# Patient Record
Sex: FEMALE | Race: WHITE | NOT HISPANIC OR LATINO | Employment: UNEMPLOYED | URBAN - METROPOLITAN AREA
[De-identification: names, ages, dates, MRNs, and addresses within clinical notes are randomized per-mention and may not be internally consistent; named-entity substitution may affect disease eponyms.]

---

## 2020-08-19 ENCOUNTER — OFFICE VISIT (OUTPATIENT)
Dept: GASTROENTEROLOGY | Facility: CLINIC | Age: 35
End: 2020-08-19
Payer: COMMERCIAL

## 2020-08-19 VITALS
HEIGHT: 65 IN | SYSTOLIC BLOOD PRESSURE: 122 MMHG | WEIGHT: 230 LBS | TEMPERATURE: 97.5 F | BODY MASS INDEX: 38.32 KG/M2 | DIASTOLIC BLOOD PRESSURE: 72 MMHG | HEART RATE: 81 BPM

## 2020-08-19 DIAGNOSIS — Z12.11 SCREENING FOR COLON CANCER: ICD-10-CM

## 2020-08-19 DIAGNOSIS — K57.32 DIVERTICULITIS OF LARGE INTESTINE WITHOUT PERFORATION OR ABSCESS WITHOUT BLEEDING: Primary | ICD-10-CM

## 2020-08-19 PROCEDURE — 99244 OFF/OP CNSLTJ NEW/EST MOD 40: CPT | Performed by: INTERNAL MEDICINE

## 2020-08-19 RX ORDER — FLUTICASONE PROPIONATE 50 MCG
1 SPRAY, SUSPENSION (ML) NASAL AS NEEDED
COMMUNITY

## 2020-08-19 RX ORDER — ETONOGESTREL 68 MG/1
68 IMPLANT SUBCUTANEOUS ONCE
COMMUNITY

## 2020-08-19 RX ORDER — METRONIDAZOLE 500 MG/1
500 TABLET ORAL EVERY 8 HOURS SCHEDULED
COMMUNITY
End: 2020-10-19

## 2020-08-19 RX ORDER — CEFUROXIME AXETIL 500 MG/1
500 TABLET ORAL EVERY 12 HOURS SCHEDULED
COMMUNITY
End: 2020-10-19

## 2020-08-19 NOTE — PATIENT INSTRUCTIONS
Encourage plenty of fluids  Low-fiber diet  Finish antibiotics given in the emergency room  Once feeling better okay to start reintroducing fiber in to diet  Schedule colonoscopy for about 2 months from now

## 2020-08-19 NOTE — PROGRESS NOTES
[facility] - Outpatient Consultation  Divina Christy 36 y o  female MRN: [id number]  Encounter: [id number]    ASSESSMENT AND PLAN:      1  Diverticulitis of large intestine without perforation or abscess without bleeding  Symptoms consistent with acute diverticulitis  Lower abdominal pain, fevers notice and altered bowel habits are all consistent and CT scan confirmed diverticulosis with focal inflammation in the sigmoid colon  Pain is improved although loose stools persist   Advise continuing the same treatment regimen and slowly advancing diet from liquids to low residue for now  If diarrhea symptoms persist and stool studies will be indicated  Ultimately should have colonoscopic evaluation to exclude other sigmoid pathology given the abnormal CT scan  · Complete prescribed antibiotics  · Encourage plenty of fluids, okay to eat low fiber diet  · As symptoms improve over the next week okay to start reintroducing fiber  · Schedule colonoscopy in about 2 months    2  Screening for colon cancer  Average risk in absence of family history  Assuming colonoscopy is negative for any neoplasia, screening colonoscopy at age 48 will be recommended  Followup Appointment:   2-3 weeks  ______________________________________________________________________    Chief Complaint   Patient presents with    Diverticulitis     [facility] follow up        HPI:   Divina Christy is a 36y o  year old female who presents with lower abdominal pain and diarrhea  Well until July  Had diarrhea and headache, tested for Covid, negative  Started to get better, but not resolved  Last week, sever abdominal pain, diffuse abdominal, bilateral   End of the week, got worse, went to urgent car, and referred to ER  CT showed sigmoid diverticulitis  Started on Ceftin/Flagyl  Pain resolved, not feeling well yet  Presently liquids and low fiber diet  No fever, though felt feverish initially    Stools still loose   No blood  Having 1-2 watery stools daily, still diarrhea, but less frequent  Normally formed stools daily  No travel, no antibiotics, No new supplements other than probiotic  No ill contacts  Municipal water  Historical Information   No past medical history on file  No past surgical history on file  Social History     Substance and Sexual Activity   Alcohol Use Yes     Social History     Substance and Sexual Activity   Drug Use Not on file     Social History     Tobacco Use   Smoking Status Never Smoker   Smokeless Tobacco Never Used     Family History   Problem Relation Age of Onset    Diabetes Mother     Heart disease Father     Diabetes Father        Meds/Allergies     Current Outpatient Medications:     cefuroxime (CEFTIN) 500 mg tablet    etonogestrel (Nexplanon) subdermal implant    fluticasone (FLONASE) 50 mcg/act nasal spray    metroNIDAZOLE (FLAGYL) 500 mg tablet    sertraline (ZOLOFT) 50 mg tablet    Allergies   Allergen Reactions    Sulfa Antibiotics        PHYSICAL EXAM:    Blood pressure 122/72, pulse 81, temperature 97 5 °F (36 4 °C), height 5' 5" (1 651 m), weight 104 kg (230 lb)  Body mass index is 38 27 kg/m²  General Appearance: NAD, cooperative, alert  Eyes: Anicteric, PERRLA, EOMI  ENT:  Normocephalic, atraumatic, normal mucosa  Neck:  Supple, symmetrical, trachea midline,   Resp:  Clear to auscultation bilaterally; no rales, rhonchi or wheezing; respirations unlabored   CV:  S1 S2, Regular rate and rhythm; no murmur, rub, or gallop  GI:  Soft, non-tender, non-distended; normal bowel sounds; no masses, no organomegaly   Rectal: Deferred  Musculoskeletal: No cyanosis, clubbing or edema  Normal ROM    Skin:  No jaundice, rashes, or lesions   Heme/Lymph: No palpable cervical lymphadenopathy  Psych: Normal affect, good eye contact  Neuro: No gross deficits, AAOx3    Lab Results:    CBC normal   electrolytes BUN and creatinine normal   LFTs normal except for ALT of 44 amylase and lipase normal  Radiology Results:     CT scan with sigmoid colon diverticulitis, no abscess or pneumoperitoneum  REVIEW OF SYSTEMS:    CONSTITUTIONAL: Denies any fever, chills, rigors, and weight loss  HEENT: No earache or tinnitus  Denies hearing loss or visual disturbances  CARDIOVASCULAR: No chest pain or palpitations  RESPIRATORY: Denies any cough, hemoptysis, shortness of breath or dyspnea on exertion  GASTROINTESTINAL: As noted in the History of Present Illness  GENITOURINARY: No problems with urination  Denies any hematuria or dysuria  NEUROLOGIC: No dizziness or vertigo, denies headaches  MUSCULOSKELETAL: Denies any muscle or joint pain  SKIN: Denies skin rashes or itching  ENDOCRINE: Denies excessive thirst  Denies intolerance to heat or cold  PSYCHOSOCIAL: Denies depression or anxiety  Denies any recent memory loss

## 2020-08-19 NOTE — LETTER
August 19, 2020     Louis Krishnan [address] [facility]  [address] [facility] [address]    Patient: Mary Zayas   YOB: 1985   Date of Visit: 8/19/2020       Dear Dr Shawn Ramos: Thank you for referring Mary Zayas to me for evaluation  Below are my notes for this consultation  If you have questions, please do not hesitate to call me  I look forward to following your patient along with you  Sincerely,        Shamar Cash MD        CC: No Recipients  Shamar Cash MD  8/19/2020 12:41 PM  Sign when Signing Visit    [address] [facility] - Outpatient Consultation  Mary Zayas 36 y o  female MRN: [id number]  Encounter: [id number]    ASSESSMENT AND PLAN:      1  Diverticulitis of large intestine without perforation or abscess without bleeding  Symptoms consistent with acute diverticulitis  Lower abdominal pain, fevers notice and altered bowel habits are all consistent and CT scan confirmed diverticulosis with focal inflammation in the sigmoid colon  Pain is improved although loose stools persist   Advise continuing the same treatment regimen and slowly advancing diet from liquids to low residue for now  If diarrhea symptoms persist and stool studies will be indicated  Ultimately should have colonoscopic evaluation to exclude other sigmoid pathology given the abnormal CT scan  · Complete prescribed antibiotics  · Encourage plenty of fluids, okay to eat low fiber diet  · As symptoms improve over the next week okay to start reintroducing fiber  · Schedule colonoscopy in about 2 months    2  Screening for colon cancer  Average risk in absence of family history  Assuming colonoscopy is negative for any neoplasia, screening colonoscopy at age 49 will be recommended        Followup Appointment:   2-3 weeks  ______________________________________________________________________    Chief Complaint   Patient presents with    Diverticulitis     [facility] follow up        HPI:   Julian Spring is a 36y o  year old female who presents with lower abdominal pain and diarrhea  Well until July  Had diarrhea and headache, tested for Covid, negative  Started to get better, but not resolved  Last week, sever abdominal pain, diffuse abdominal, bilateral   End of the week, got worse, went to urgent car, and referred to ER  CT showed sigmoid diverticulitis  Started on Ceftin/Flagyl  Pain resolved, not feeling well yet  Presently liquids and low fiber diet  No fever, though felt feverish initially  Stools still loose  No blood  Having 1-2 watery stools daily, still diarrhea, but less frequent  Normally formed stools daily  No travel, no antibiotics, No new supplements other than probiotic  No ill contacts  Municipal water  Historical Information   No past medical history on file  No past surgical history on file  Social History     Substance and Sexual Activity   Alcohol Use Yes     Social History     Substance and Sexual Activity   Drug Use Not on file     Social History     Tobacco Use   Smoking Status Never Smoker   Smokeless Tobacco Never Used     Family History   Problem Relation Age of Onset    Diabetes Mother     Heart disease Father     Diabetes Father        Meds/Allergies     Current Outpatient Medications:     cefuroxime (CEFTIN) 500 mg tablet    etonogestrel (Nexplanon) subdermal implant    fluticasone (FLONASE) 50 mcg/act nasal spray    metroNIDAZOLE (FLAGYL) 500 mg tablet    sertraline (ZOLOFT) 50 mg tablet    Allergies   Allergen Reactions    Sulfa Antibiotics        PHYSICAL EXAM:    Blood pressure 122/72, pulse 81, temperature 97 5 °F (36 4 °C), height 5' 5" (1 651 m), weight 104 kg (230 lb)  Body mass index is 38 27 kg/m²  General Appearance: NAD, cooperative, alert  Eyes: Anicteric, PERRLA, EOMI  ENT:  Normocephalic, atraumatic, normal mucosa      Neck:  Supple, symmetrical, trachea midline,   Resp:  Clear to auscultation bilaterally; no rales, rhonchi or wheezing; respirations unlabored   CV:  S1 S2, Regular rate and rhythm; no murmur, rub, or gallop  GI:  Soft, non-tender, non-distended; normal bowel sounds; no masses, no organomegaly   Rectal: Deferred  Musculoskeletal: No cyanosis, clubbing or edema  Normal ROM  Skin:  No jaundice, rashes, or lesions   Heme/Lymph: No palpable cervical lymphadenopathy  Psych: Normal affect, good eye contact  Neuro: No gross deficits, AAOx3    Lab Results:    CBC normal   electrolytes BUN and creatinine normal   LFTs normal except for ALT of 44 amylase and lipase normal  Radiology Results:     CT scan with sigmoid colon diverticulitis, no abscess or pneumoperitoneum  REVIEW OF SYSTEMS:    CONSTITUTIONAL: Denies any fever, chills, rigors, and weight loss  HEENT: No earache or tinnitus  Denies hearing loss or visual disturbances  CARDIOVASCULAR: No chest pain or palpitations  RESPIRATORY: Denies any cough, hemoptysis, shortness of breath or dyspnea on exertion  GASTROINTESTINAL: As noted in the History of Present Illness  GENITOURINARY: No problems with urination  Denies any hematuria or dysuria  NEUROLOGIC: No dizziness or vertigo, denies headaches  MUSCULOSKELETAL: Denies any muscle or joint pain  SKIN: Denies skin rashes or itching  ENDOCRINE: Denies excessive thirst  Denies intolerance to heat or cold  PSYCHOSOCIAL: Denies depression or anxiety  Denies any recent memory loss

## 2020-09-14 ENCOUNTER — OFFICE VISIT (OUTPATIENT)
Dept: GASTROENTEROLOGY | Facility: CLINIC | Age: 35
End: 2020-09-14
Payer: COMMERCIAL

## 2020-09-14 VITALS
DIASTOLIC BLOOD PRESSURE: 82 MMHG | SYSTOLIC BLOOD PRESSURE: 124 MMHG | WEIGHT: 216 LBS | HEIGHT: 65 IN | BODY MASS INDEX: 35.99 KG/M2 | HEART RATE: 90 BPM | TEMPERATURE: 96.9 F

## 2020-09-14 DIAGNOSIS — Z12.11 SCREENING FOR COLON CANCER: ICD-10-CM

## 2020-09-14 DIAGNOSIS — K57.92 DIVERTICULITIS: Primary | ICD-10-CM

## 2020-09-14 PROCEDURE — 99214 OFFICE O/P EST MOD 30 MIN: CPT | Performed by: INTERNAL MEDICINE

## 2020-09-14 RX ORDER — NAPROXEN 250 MG/1
250 TABLET ORAL AS NEEDED
COMMUNITY

## 2020-09-14 RX ORDER — AMOXICILLIN AND CLAVULANATE POTASSIUM 875; 125 MG/1; MG/1
1 TABLET, FILM COATED ORAL EVERY 12 HOURS SCHEDULED
COMMUNITY
End: 2020-10-19

## 2020-09-14 NOTE — LETTER
September 14, 2020     Wayne Cain [facility] [address]  [address] [place], [place] [address] [place] [id number]    Patient: Bo Graham   YOB: 1985   Date of Visit: 9/14/2020       Dear Dr Mckenzie Kunz: Thank you for referring Bo Graham to me for evaluation  Below are my notes for this consultation  If you have questions, please do not hesitate to call me  I look forward to following your patient along with you  Sincerely,        Farida Larson MD        CC: No Recipients  Farida Larson MD  9/14/2020  5:35 PM  Sign when Signing Visit  [address] [facility] - Outpatient Follow-up Note  Bo Graham 36 y o  female MRN: [id number]  Encounter: [id number]    ASSESSMENT AND PLAN:      1  Diverticulitis  Symptoms appear to be improving  She did have a relapse of pain last week but was found to have left renal calculus and hydronephrosis  There is a soft Call of possible diverticular inflammation in the distal descending colon  Antibiotics given for possible UTI/bilobed is also a reasonable cover should there be diverticular inflammation  · Complete course of Augmentin as recommended  · Urology consultation for renal calculi and hydronephrosis  · [place] diet as tolerated, limit fibrous foods  · Proceed with colonoscopy next month as planned    2  Screening for colon cancer  Average risk  Further recommendations pending results colonoscopy  Followup Appointment:  About 2 months pending colonoscopy next month  ______________________________________________________________________    Chief Complaint   Patient presents with    Follow up-diverticulitis     HPI:  Got better after antibiotics      Lots of bowel sounds but pain resolved  Diarrhea got better  Started with LLQ pain last week, diagnosed with kidney stones on left and possible diverticulitis  Now on Augmentin, stools a little looser  Trying to stick to low fiber diet  Some dyspepsia     Stools about twice a day  Loose, no melena or heme  No fever  Historical Information   History reviewed  No pertinent past medical history  History reviewed  No pertinent surgical history  Social History     Substance and Sexual Activity   Alcohol Use Yes     Social History     Substance and Sexual Activity   Drug Use Not on file     Social History     Tobacco Use   Smoking Status Never Smoker   Smokeless Tobacco Never Used     Family History   Problem Relation Age of Onset    Diabetes Mother     Heart disease Father     Diabetes Father     Colon polyps Neg Hx     Colon cancer Neg Hx     Inflammatory bowel disease Neg Hx          Current Outpatient Medications:     amoxicillin-clavulanate (AUGMENTIN) 875-125 mg per tablet    etonogestrel (Nexplanon) subdermal implant    fluticasone (FLONASE) 50 mcg/act nasal spray    naproxen (NAPROSYN) 250 mg tablet    cefuroxime (CEFTIN) 500 mg tablet    metroNIDAZOLE (FLAGYL) 500 mg tablet    sertraline (ZOLOFT) 50 mg tablet  Allergies   Allergen Reactions    Sulfa Antibiotics      Reviewed medications and allergies and updated as indicated    PHYSICAL EXAM:    Blood pressure 124/82, pulse 90, temperature (!) 96 9 °F (36 1 °C), height 5' 5" (1 651 m), weight 98 kg (216 lb)  Body mass index is 35 94 kg/m²  General Appearance: NAD, cooperative, alert  Eyes: Anicteric, PERRLA, EOMI  ENT:  Normocephalic, atraumatic, normal mucosa  Neck:  Supple, symmetrical, trachea midline  Resp:  Clear to auscultation bilaterally; no rales, rhonchi or wheezing; respirations unlabored   CV:  S1 S2, Regular rate and rhythm; no murmur, rub, or gallop  GI:  Soft, non-tender, non-distended; normal bowel sounds; no masses, no organomegaly   Rectal: Deferred  Musculoskeletal: No cyanosis, clubbing or edema  Normal ROM    Skin:  No jaundice, rashes, or lesions   Heme/Lymph: No palpable cervical lymphadenopathy  Psych: Normal affect, good eye contact  Neuro: No gross deficits, AAOx3    Lab Results: No results found for: WBC, HGB, HCT, MCV, PLT  No results found for: NA, K, CL, CO2, ANIONGAP, BUN, CREATININE, GLUCOSE, GLUF, CALCIUM, CORRECTEDCA, AST, ALT, ALKPHOS, PROT, BILITOT, EGFR  No results found for: IRON, TIBC, FERRITIN  No results found for: LIPASE    Radiology Results:   CT scan with left hydronephrosis  Diverticulosis with 1 focal area of pericolonic fat stranding in the distal descending colon

## 2020-09-14 NOTE — PROGRESS NOTES
[facility] - Outpatient Follow-up Note  Divina Christy 36 y o  female MRN: [id number]  Encounter: [id number]    ASSESSMENT AND PLAN:      1  Diverticulitis  Symptoms appear to be improving  She did have a relapse of pain last week but was found to have left renal calculus and hydronephrosis  There is a soft Call of possible diverticular inflammation in the distal descending colon  Antibiotics given for possible UTI/bilobed is also a reasonable cover should there be diverticular inflammation  · Complete course of Augmentin as recommended  · Urology consultation for renal calculi and hydronephrosis  · [place] diet as tolerated, limit fibrous foods  · Proceed with colonoscopy next month as planned    2  Screening for colon cancer  Average risk  Further recommendations pending results colonoscopy  Followup Appointment:  About 2 months pending colonoscopy next month  ______________________________________________________________________    Chief Complaint   Patient presents with    Follow up-diverticulitis     HPI:  Got better after antibiotics      Lots of bowel sounds but pain resolved  Diarrhea got better  Started with LLQ pain last week, diagnosed with kidney stones on left and possible diverticulitis  Now on Augmentin, stools a little looser  Trying to stick to low fiber diet  Some dyspepsia     Stools about twice a day  Loose, no melena or heme  No fever  Historical Information   History reviewed  No pertinent past medical history  History reviewed  No pertinent surgical history    Social History     Substance and Sexual Activity   Alcohol Use Yes     Social History     Substance and Sexual Activity   Drug Use Not on file     Social History     Tobacco Use   Smoking Status Never Smoker   Smokeless Tobacco Never Used     Family History   Problem Relation Age of Onset    Diabetes Mother     Heart disease Father     Diabetes Father     Colon polyps Neg Hx     Colon cancer Neg Hx     Inflammatory bowel disease Neg Hx          Current Outpatient Medications:     amoxicillin-clavulanate (AUGMENTIN) 875-125 mg per tablet    etonogestrel (Nexplanon) subdermal implant    fluticasone (FLONASE) 50 mcg/act nasal spray    naproxen (NAPROSYN) 250 mg tablet    cefuroxime (CEFTIN) 500 mg tablet    metroNIDAZOLE (FLAGYL) 500 mg tablet    sertraline (ZOLOFT) 50 mg tablet  Allergies   Allergen Reactions    Sulfa Antibiotics      Reviewed medications and allergies and updated as indicated    PHYSICAL EXAM:    Blood pressure 124/82, pulse 90, temperature (!) 96 9 °F (36 1 °C), height 5' 5" (1 651 m), weight 98 kg (216 lb)  Body mass index is 35 94 kg/m²  General Appearance: NAD, cooperative, alert  Eyes: Anicteric, PERRLA, EOMI  ENT:  Normocephalic, atraumatic, normal mucosa  Neck:  Supple, symmetrical, trachea midline  Resp:  Clear to auscultation bilaterally; no rales, rhonchi or wheezing; respirations unlabored   CV:  S1 S2, Regular rate and rhythm; no murmur, rub, or gallop  GI:  Soft, non-tender, non-distended; normal bowel sounds; no masses, no organomegaly   Rectal: Deferred  Musculoskeletal: No cyanosis, clubbing or edema  Normal ROM  Skin:  No jaundice, rashes, or lesions   Heme/Lymph: No palpable cervical lymphadenopathy  Psych: Normal affect, good eye contact  Neuro: No gross deficits, AAOx3    Lab Results:   No results found for: WBC, HGB, HCT, MCV, PLT  No results found for: NA, K, CL, CO2, ANIONGAP, BUN, CREATININE, GLUCOSE, GLUF, CALCIUM, CORRECTEDCA, AST, ALT, ALKPHOS, PROT, BILITOT, EGFR  No results found for: IRON, TIBC, FERRITIN  No results found for: LIPASE    Radiology Results:   CT scan with left hydronephrosis  Diverticulosis with 1 focal area of pericolonic fat stranding in the distal descending colon

## 2020-09-14 NOTE — PATIENT INSTRUCTIONS
Low residue diet as tolerated  Encourage plenty of fluids  Complete course of Augmentin for UTI as recommended  Urology consultation for kidney stones  Colonoscopy next month as planned

## 2020-10-12 ENCOUNTER — TELEMEDICINE (OUTPATIENT)
Dept: GASTROENTEROLOGY | Facility: CLINIC | Age: 35
End: 2020-10-12

## 2020-10-12 VITALS — HEIGHT: 65 IN | BODY MASS INDEX: 34.82 KG/M2 | WEIGHT: 209 LBS

## 2020-10-12 DIAGNOSIS — K57.92 DIVERTICULITIS: Primary | ICD-10-CM

## 2020-10-19 ENCOUNTER — HOSPITAL ENCOUNTER (OUTPATIENT)
Dept: GASTROENTEROLOGY | Facility: AMBULATORY SURGERY CENTER | Age: 35
Discharge: HOME | End: 2020-10-19
Payer: COMMERCIAL

## 2020-10-19 ENCOUNTER — ANESTHESIA EVENT (OUTPATIENT)
Dept: GASTROENTEROLOGY | Facility: AMBULATORY SURGERY CENTER | Age: 35
End: 2020-10-19

## 2020-10-19 ENCOUNTER — ANESTHESIA (OUTPATIENT)
Dept: GASTROENTEROLOGY | Facility: AMBULATORY SURGERY CENTER | Age: 35
End: 2020-10-19

## 2020-10-19 VITALS — HEART RATE: 57 BPM

## 2020-10-19 VITALS
HEART RATE: 59 BPM | DIASTOLIC BLOOD PRESSURE: 63 MMHG | TEMPERATURE: 97.7 F | RESPIRATION RATE: 33 BRPM | SYSTOLIC BLOOD PRESSURE: 109 MMHG | OXYGEN SATURATION: 99 %

## 2020-10-19 DIAGNOSIS — K57.32 DIVERTICULITIS OF LARGE INTESTINE WITHOUT PERFORATION OR ABSCESS WITHOUT BLEEDING: ICD-10-CM

## 2020-10-19 DIAGNOSIS — Z12.11 SCREENING FOR COLON CANCER: ICD-10-CM

## 2020-10-19 LAB
EXT PREGNANCY TEST URINE: NEGATIVE
EXT. CONTROL: NORMAL

## 2020-10-19 PROCEDURE — 45380 COLONOSCOPY AND BIOPSY: CPT | Performed by: INTERNAL MEDICINE

## 2020-10-19 PROCEDURE — 45385 COLONOSCOPY W/LESION REMOVAL: CPT | Performed by: INTERNAL MEDICINE

## 2020-10-19 RX ORDER — SACCHAROMYCES BOULARDII 250 MG
250 CAPSULE ORAL 2 TIMES DAILY
COMMUNITY

## 2020-10-19 RX ORDER — SODIUM CHLORIDE 9 MG/ML
50 INJECTION, SOLUTION INTRAVENOUS CONTINUOUS
Status: DISCONTINUED | OUTPATIENT
Start: 2020-10-19 | End: 2020-10-23 | Stop reason: HOSPADM

## 2020-10-19 RX ORDER — PROPOFOL 10 MG/ML
INJECTION, EMULSION INTRAVENOUS AS NEEDED
Status: DISCONTINUED | OUTPATIENT
Start: 2020-10-19 | End: 2020-10-19

## 2020-10-19 RX ADMIN — PROPOFOL 200 MG: 10 INJECTION, EMULSION INTRAVENOUS at 07:51

## 2020-10-19 RX ADMIN — PROPOFOL 100 MG: 10 INJECTION, EMULSION INTRAVENOUS at 07:45

## 2020-10-19 RX ADMIN — SODIUM CHLORIDE 50 ML/HR: 9 INJECTION, SOLUTION INTRAVENOUS at 07:15

## 2020-10-19 RX ADMIN — SODIUM CHLORIDE: 9 INJECTION, SOLUTION INTRAVENOUS at 07:00

## 2020-12-21 ENCOUNTER — OFFICE VISIT (OUTPATIENT)
Dept: GASTROENTEROLOGY | Facility: CLINIC | Age: 35
End: 2020-12-21
Payer: COMMERCIAL

## 2020-12-21 VITALS
HEIGHT: 65 IN | SYSTOLIC BLOOD PRESSURE: 120 MMHG | BODY MASS INDEX: 33.99 KG/M2 | DIASTOLIC BLOOD PRESSURE: 84 MMHG | WEIGHT: 204 LBS

## 2020-12-21 DIAGNOSIS — K62.5 RECTAL BLEEDING: ICD-10-CM

## 2020-12-21 DIAGNOSIS — Z86.010 HISTORY OF COLON POLYPS: ICD-10-CM

## 2020-12-21 DIAGNOSIS — K62.89 RECTAL PAIN: Primary | ICD-10-CM

## 2020-12-21 PROCEDURE — 99213 OFFICE O/P EST LOW 20 MIN: CPT | Performed by: NURSE PRACTITIONER

## 2021-01-06 LAB — HBA1C MFR BLD HPLC: 5 %

## 2021-01-18 ENCOUNTER — OFFICE VISIT (OUTPATIENT)
Dept: GASTROENTEROLOGY | Facility: CLINIC | Age: 36
End: 2021-01-18
Payer: COMMERCIAL

## 2021-01-18 VITALS
SYSTOLIC BLOOD PRESSURE: 118 MMHG | WEIGHT: 200 LBS | HEIGHT: 65 IN | DIASTOLIC BLOOD PRESSURE: 72 MMHG | BODY MASS INDEX: 33.32 KG/M2

## 2021-01-18 DIAGNOSIS — Z86.010 HISTORY OF COLON POLYPS: ICD-10-CM

## 2021-01-18 DIAGNOSIS — K62.5 RECTAL BLEEDING: ICD-10-CM

## 2021-01-18 DIAGNOSIS — K62.89 RECTAL PAIN: Primary | ICD-10-CM

## 2021-01-18 PROCEDURE — 99213 OFFICE O/P EST LOW 20 MIN: CPT | Performed by: NURSE PRACTITIONER

## 2021-01-18 NOTE — PROGRESS NOTES
[email or web address] - Outpatient Follow-up Note  Vanessa Mcelroy 30 y o  female MRN: [id number]  Encounter: [id number]    ASSESSMENT AND PLAN:      1  Rectal pain  2  Rectal bleeding  Continues with rectal pain with defecation and occasional blood with wiping on the toilet paper  She notes that her symptoms have improved and the pain is not as severe as it was previously  She has been using the nitroglycerin ointment but stopped using for several days and notes that the pain returned  Still with pain on internal rectal exam as well as external skin tag and possible small internal hemorrhoid as well  Suspect anal fissure, hemorrhoids  - continue nitroglycerin 0 125% ointment, apply a pea-sized amount 4 times daily  - continue tucks pads as needed  - Sitz baths 2-3 times per day  - increase fluid intake  - continue Benefiber 1-2 tsp daily  - continue over-the-counter stool softener  - continue MiraLax half a cap full as needed  - if symptoms do not improve, consider Colorectal surgery evaluation  - if patient does not have improvement in her symptoms by mid February, asked her to contact our office    3  History of colon polyps  Most recent colonoscopy performed on 10/19/2020 showed a polyp in the sigmoid colon, mild diverticulosis in the right left colon, normal terminal ileum  Biopsies revealed tubular adenomatous polyps and random biopsies were negative for colitis, dysplasia  A 5 year recall advised, October 2025  Followup Appointment:  6 weeks  ______________________________________________________________________    Chief Complaint   Patient presents with    Follow-up    Anal Fissure     HPI:  Vanessa Mcelroy is a 30y o  year old female who was seen in the office today for follow-up  She was last seen on 12/21/2020  She reports that her symptoms are somewhat improved and her pain is not as severe as it previously had been    She still reports pain with bowel movements and rates pain 5 to 6/10  She stopped using nitroglycerin for several days and reports that the pain worsened  She has been taking fiber and occasional MiraLax and notes that she is moving her bowels approximately every other day  She notes that her stools are soft and formed  She does have occasional blood with wiping on the toilet paper  She denies any fever, chills, upper GI symptoms, nausea, vomiting, lower abdominal pain or cramping  Her appetite and weight are stable        Historical Information   Past Medical History:   Diagnosis Date    Anxiety     Depression     Diverticulitis     Kidney stone     Seasonal allergies     Sleep apnea      Past Surgical History:   Procedure Laterality Date    COLONOSCOPY  10/19/2020    polyp in the sigmoid colon, mild diverticulosis in right and left colon, normal TI, biopsies showed tubular adenomatous polyps, random biopsies were negative for colitis, dysplasia     EYE SURGERY      TONSILLECTOMY      and adenoids    WISDOM TOOTH EXTRACTION       Social History     Substance and Sexual Activity   Alcohol Use Not Currently     Social History     Substance and Sexual Activity   Drug Use Yes    Types: Marijuana     Social History     Tobacco Use   Smoking Status Never Smoker   Smokeless Tobacco Never Used     Family History   Problem Relation Age of Onset    Diabetes Mother     Heart disease Father     Diabetes Father     Colon polyps Neg Hx     Colon cancer Neg Hx     Inflammatory bowel disease Neg Hx          Current Outpatient Medications:     etonogestrel (Nexplanon) subdermal implant    fluticasone (FLONASE) 50 mcg/act nasal spray    naproxen (NAPROSYN) 250 mg tablet    nitroglycerin (NITRO-BID) 2 % ointment    saccharomyces boulardii (FLORASTOR) 250 mg capsule    sertraline (ZOLOFT) 50 mg tablet  Allergies   Allergen Reactions    Sulfa Antibiotics      Unknown from childhood     Reviewed medications and allergies and updated as indicated    PHYSICAL EXAM:    Blood pressure 118/72, height 5' 5" (1 651 m), weight 90 7 kg (200 lb)  Body mass index is 33 28 kg/m²  General Appearance: NAD, cooperative, alert  Eyes: Anicteric, PERRLA, EOMI  ENT:  Normocephalic, atraumatic, normal mucosa  Neck:  Supple, symmetrical, trachea midline  Resp:  Clear to auscultation bilaterally; no rales, rhonchi or wheezing; respirations unlabored   CV:  S1 S2, Regular rate and rhythm; no murmur, rub, or gallop  GI:  Soft, non-tender, non-distended; normal bowel sounds; no masses, no organomegaly   Rectal: +small skin tag (external), +small internal hemorrhoid, +pain on internal rectal exam   Musculoskeletal: No cyanosis, clubbing or edema  Normal ROM  Skin:  No jaundice, rashes, or lesions   Heme/Lymph: No palpable cervical lymphadenopathy  Psych: Normal affect, good eye contact  Neuro: No gross deficits, AAOx3    Lab Results:   No results found for: WBC, HGB, HCT, MCV, PLT  No results found for: NA, K, CL, CO2, ANIONGAP, BUN, CREATININE, GLUCOSE, GLUF, CALCIUM, CORRECTEDCA, AST, ALT, ALKPHOS, PROT, BILITOT, EGFR  No results found for: IRON, TIBC, FERRITIN  No results found for: LIPASE    Radiology Results:   No results found

## 2021-01-18 NOTE — PATIENT INSTRUCTIONS
Continue Nitroglycerin ointment 4 times daily   Sitz baths 2-3 times per day if you are able to   Increase fluid intake   High fiber diet  Continue fiber supplement daily 1-2 tsp   Continue Miralax 1/2 capful daily     Follow up in 6 weeks     Call office in 3 weeks if you are still having pain       Anal Fissure   WHAT YOU NEED TO KNOW:   An anal fissure is a cut or tear in the tissue inside your anus  An anal fissure may be acute or chronic  An acute anal fissure is usually small and shallow and often heals without treatment  A chronic fissure may last longer than a month and will usually require treatment  A chronic anal fissure comes back after treatment  DISCHARGE INSTRUCTIONS:   Medicine:   · Topical medicine: Topical medicine may be put just inside your anus  This medicine may help your anal muscle relax and increase blood flow to your anus  This medicine may contain anesthesia to help decrease your pain  Your healthcare provider will teach you the right way to use topical medicine  · Stool softeners: Your healthcare provider may also give you medicine that makes your bowel movements softer  This helps prevent constipation  You will be less likely to strain and cause an anal fissure if you are not constipated  · Take your medicine as directed  Contact your healthcare provider if you think your medicine is not helping or if you have side effects  Tell him of her if you are allergic to any medicine  Keep a list of the medicines, vitamins, and herbs you take  Include the amounts, and when and why you take them  Bring the list or the pill bottles to follow-up visits  Carry your medicine list with you in case of an emergency  Follow up with your healthcare provider as directed: Your healthcare provider will need to make sure your anal fissure heals  Write down your questions so you remember to ask them during your visits  Bathing: You may need to soak in a warm tub or take a sitz bath   This may help decrease pain and swelling  You may need to do this more than once a day  Ask your healthcare provider how to use a sitz bath and how often you should bathe  Bowel care:  Do not ignore the urge to have a bowel movement  Do not strain  Clean the area gently after every bowel movement  Nutrition:  Eat foods that are high in fiber to help keep your bowel movements soft  High-fiber foods include fruits, vegetables, and whole grains  Drink more liquids to help soften your bowel movements  This will help prevent you from straining  Ask your healthcare provider how much liquid you should drink each day  Sexual intercourse:  Avoid anal intercourse for as long as directed by your healthcare provider  Anal intercourse may make it harder for your anal fissure to heal  It may also tear more tissue around your anus  Contact your healthcare provider if:   · You have a fever  · You still have pain after taking pain medicine  · You are unable to have a bowel movement  · You have spasms in your anus that do not stop  · You have questions or concerns about your condition or care  Return to the emergency department if:   · You have very bad pain in or around your anus  · You have bleeding from your anus that does not stop  © Copyright [address] Information is for End User's use only and may not be sold, redistributed or otherwise used for commercial purposes  All illustrations and images included in CareNotes® are the copyrighted property of [employer]  or [employer]  The above information is an  only  It is not intended as medical advice for individual conditions or treatments  Talk to your doctor, nurse or pharmacist before following any medical regimen to see if it is safe and effective for you

## 2021-03-15 ENCOUNTER — OFFICE VISIT (OUTPATIENT)
Dept: GASTROENTEROLOGY | Facility: CLINIC | Age: 36
End: 2021-03-15
Payer: COMMERCIAL

## 2021-03-15 VITALS
HEART RATE: 76 BPM | DIASTOLIC BLOOD PRESSURE: 90 MMHG | BODY MASS INDEX: 34.16 KG/M2 | WEIGHT: 205 LBS | SYSTOLIC BLOOD PRESSURE: 142 MMHG | HEIGHT: 65 IN

## 2021-03-15 DIAGNOSIS — Z86.010 HISTORY OF COLON POLYPS: ICD-10-CM

## 2021-03-15 DIAGNOSIS — K57.90 DIVERTICULAR DISEASE: ICD-10-CM

## 2021-03-15 DIAGNOSIS — K62.5 RECTAL BLEEDING: ICD-10-CM

## 2021-03-15 DIAGNOSIS — K60.2 ANAL FISSURE: Primary | ICD-10-CM

## 2021-03-15 PROCEDURE — 99213 OFFICE O/P EST LOW 20 MIN: CPT | Performed by: INTERNAL MEDICINE

## 2021-03-15 NOTE — PATIENT INSTRUCTIONS
Continue with plenty of dietary fiber and fluids  Continue with fiber supplement daily  Continue nitroglycerin  Consultation with [facility], Dr Adria Guillermo or Dr Blue King

## 2021-03-15 NOTE — PROGRESS NOTES
[id number] - Outpatient Follow-up Note  Connie Sinclair 30 y o  female MRN: [id number]  Encounter: [id number]    ASSESSMENT AND PLAN:      1  Anal fissure  2  Rectal bleeding   Perianal pain especially on defecation and rectal bleeding are consistent with persistent anal fissure  Likely also has hemorrhoids but exquisite tenderness on examination is more consistent with a posterior midline fissure  Failing therapy with fiber and nitroglycerin  Need surgical evaluation and treatment  ·   Continue with fiber supplementation  ·   Continue with nitroglycerin ointment  ·   Refer to [facility] for treatment of probable anal fissure and possibly hemorrhoids    3  Diverticular disease   Previous history of diverticulitis  No abdominal symptoms presently  No further interventions needed  ·   Continue with fiber supplementation    4  History of colon polyps   Adenomatous polyps found on colonoscopy performed after bout of diverticulitis  Small distal polyps but adenomas present  Recommend surveillance every 5 years  ·   Next colonoscopy due October 2025      Followup Appointment:   As needed pending surgical consultation  ______________________________________________________________________    Chief Complaint   Patient presents with    Follow-up     Diverticulitis     HPI:  Still with perianal pain and bleeding  Stools soft  Taking fiber and Miralax  Pain on defecation  Not bloody stools  Blood on toilet paper in toilet  No abdominal pain nausea or vomiting  Eating okay      Historical Information   Past Medical History:   Diagnosis Date    Anxiety     Colon polyp     Depression     Diverticulitis     Kidney stone     Seasonal allergies     Sleep apnea      Past Surgical History:   Procedure Laterality Date    COLONOSCOPY  10/19/2020    polyp in the sigmoid colon, mild diverticulosis in right and left colon, normal TI, biopsies showed tubular adenomatous polyps, random biopsies were negative for colitis, dysplasia     EYE SURGERY      TONSILLECTOMY      and adenoids    WISDOM TOOTH EXTRACTION       Social History     Substance and Sexual Activity   Alcohol Use Not Currently     Social History     Substance and Sexual Activity   Drug Use Yes    Types: Marijuana     Social History     Tobacco Use   Smoking Status Never Smoker   Smokeless Tobacco Never Used     Family History   Problem Relation Age of Onset    Diabetes Mother     Heart disease Father     Diabetes Father     Colon polyps Neg Hx     Colon cancer Neg Hx     Inflammatory bowel disease Neg Hx          Current Outpatient Medications:     etonogestrel (Nexplanon) subdermal implant    fluticasone (FLONASE) 50 mcg/act nasal spray    naproxen (NAPROSYN) 250 mg tablet    nitroglycerin (NITRO-BID) 2 % ointment    saccharomyces boulardii (FLORASTOR) 250 mg capsule    sertraline (ZOLOFT) 50 mg tablet  Allergies   Allergen Reactions    Sulfa Antibiotics      Unknown from childhood     Reviewed medications and allergies and updated as indicated    PHYSICAL EXAM:    Blood pressure 142/90, pulse 76, height 5' 5" (1 651 m), weight 93 kg (205 lb)  Body mass index is 34 11 kg/m²  General Appearance: NAD, cooperative, alert  Eyes: Anicteric, PERRLA, EOMI  ENT:  Normocephalic, atraumatic, normal mucosa  Neck:  Supple, symmetrical, trachea midline  Resp:  Clear to auscultation bilaterally; no rales, rhonchi or wheezing; respirations unlabored   CV:  S1 S2, Regular rate and rhythm; no murmur, rub, or gallop  GI:  Soft, non-tender, non-distended; normal bowel sounds; no masses, no organomegaly   Rectal: Deferred  Musculoskeletal: No cyanosis, clubbing or edema  Normal ROM    Skin:  No jaundice, rashes, or lesions   Heme/Lymph: No palpable cervical lymphadenopathy  Psych: Normal affect, good eye contact  Neuro: No gross deficits, AAOx3    Lab Results:   No results found for: WBC, HGB, HCT, MCV, PLT  No results found for: NA, K, CL, CO2, ANIONGAP, BUN, CREATININE, GLUCOSE, GLUF, CALCIUM, CORRECTEDCA, AST, ALT, ALKPHOS, PROT, BILITOT, EGFR  No results found for: IRON, TIBC, FERRITIN  No results found for: LIPASE    Radiology Results:   No results found  Dilip Daigle

## 2021-03-15 NOTE — LETTER
March 15, 2021     Maranda Nash, [address] [facility]  [address] [facility] [facility] [place] [address]    Patient: Lashonda Blunt   YOB: 1985   Date of Visit: 3/15/2021       Dear Dr Mattie Booth: Thank you for referring Lashonda Blunt to me for evaluation  Below are my notes for this consultation  If you have questions, please do not hesitate to call me  I look forward to following your patient along with you  Sincerely,        Staci Melchor MD        CC: MD Staci Coon MD  3/15/2021 12:31 PM  Sign when Signing Visit    [facility] - Outpatient Follow-up Note  Lashonda Blunt 30 y o  female MRN: [id number]  Encounter: [id number]    ASSESSMENT AND PLAN:      1  Anal fissure  2  Rectal bleeding   Perianal pain especially on defecation and rectal bleeding are consistent with persistent anal fissure  Likely also has hemorrhoids but exquisite tenderness on examination is more consistent with a posterior midline fissure  Failing therapy with fiber and nitroglycerin  Need surgical evaluation and treatment  ·   Continue with fiber supplementation  ·   Continue with nitroglycerin ointment  ·   Refer to [facility] for treatment of probable anal fissure and possibly hemorrhoids    3  Diverticular disease   Previous history of diverticulitis  No abdominal symptoms presently  No further interventions needed  ·   Continue with fiber supplementation    4  History of colon polyps   Adenomatous polyps found on colonoscopy performed after bout of diverticulitis  Small distal polyps but adenomas present  Recommend surveillance every 5 years    ·   Next colonoscopy due October 2025      Followup Appointment:   As needed pending surgical consultation  ______________________________________________________________________    Chief Complaint   Patient presents with    Follow-up     Diverticulitis     HPI:  Still with perianal pain and bleeding  Stools soft  Taking fiber and Miralax  Pain on defecation  Not bloody stools  Blood on toilet paper in toilet  No abdominal pain nausea or vomiting  Eating okay  Historical Information   Past Medical History:   Diagnosis Date    Anxiety     Colon polyp     Depression     Diverticulitis     Kidney stone     Seasonal allergies     Sleep apnea      Past Surgical History:   Procedure Laterality Date    COLONOSCOPY  10/19/2020    polyp in the sigmoid colon, mild diverticulosis in right and left colon, normal TI, biopsies showed tubular adenomatous polyps, random biopsies were negative for colitis, dysplasia     EYE SURGERY      TONSILLECTOMY      and adenoids    WISDOM TOOTH EXTRACTION       Social History     Substance and Sexual Activity   Alcohol Use Not Currently     Social History     Substance and Sexual Activity   Drug Use Yes    Types: Marijuana     Social History     Tobacco Use   Smoking Status Never Smoker   Smokeless Tobacco Never Used     Family History   Problem Relation Age of Onset    Diabetes Mother     Heart disease Father     Diabetes Father     Colon polyps Neg Hx     Colon cancer Neg Hx     Inflammatory bowel disease Neg Hx          Current Outpatient Medications:     etonogestrel (Nexplanon) subdermal implant    fluticasone (FLONASE) 50 mcg/act nasal spray    naproxen (NAPROSYN) 250 mg tablet    nitroglycerin (NITRO-BID) 2 % ointment    saccharomyces boulardii (FLORASTOR) 250 mg capsule    sertraline (ZOLOFT) 50 mg tablet  Allergies   Allergen Reactions    Sulfa Antibiotics      Unknown from childhood     Reviewed medications and allergies and updated as indicated    PHYSICAL EXAM:    Blood pressure 142/90, pulse 76, height 5' 5" (1 651 m), weight 93 kg (205 lb)  Body mass index is 34 11 kg/m²  General Appearance: NAD, cooperative, alert  Eyes: Anicteric, PERRLA, EOMI  ENT:  Normocephalic, atraumatic, normal mucosa      Neck:  Supple, symmetrical, trachea midline  Resp:  Clear to auscultation bilaterally; no rales, rhonchi or wheezing; respirations unlabored   CV:  S1 S2, Regular rate and rhythm; no murmur, rub, or gallop  GI:  Soft, non-tender, non-distended; normal bowel sounds; no masses, no organomegaly   Rectal: Deferred  Musculoskeletal: No cyanosis, clubbing or edema  Normal ROM  Skin:  No jaundice, rashes, or lesions   Heme/Lymph: No palpable cervical lymphadenopathy  Psych: Normal affect, good eye contact  Neuro: No gross deficits, AAOx3    Lab Results:   No results found for: WBC, HGB, HCT, MCV, PLT  No results found for: NA, K, CL, CO2, ANIONGAP, BUN, CREATININE, GLUCOSE, GLUF, CALCIUM, CORRECTEDCA, AST, ALT, ALKPHOS, PROT, BILITOT, EGFR  No results found for: IRON, TIBC, FERRITIN  No results found for: LIPASE    Radiology Results:   No results found  Carol Healy

## 2021-04-13 DIAGNOSIS — Z23 ENCOUNTER FOR IMMUNIZATION: ICD-10-CM

## 2021-07-13 ENCOUNTER — TELEPHONE (OUTPATIENT)
Dept: GASTROENTEROLOGY | Facility: CLINIC | Age: 36
End: 2021-07-13

## 2021-07-13 NOTE — TELEPHONE ENCOUNTER
Called pt back, she states had diverticulitis 8/2020 and 5/2021 [facility] ER) and is nervous about getting it again  Her present issue is constipation though  Since her ER in May she has been having difficulty moving her bowels  Is going every 3-4 days after straining  The result is soft but "very large " She does have rectal bleeding with it but believes is related to her hemorrhoids/fissure  Once she gets to day 2 or 3 she develops lower abdominal pain, cramping and bloating  Denies fever  Her last bowel movement was 7/11  She has been doing colonic massages, increasing her hydration, and increasing her dietary fibers (high fiber oatmeal, wheat bread and vegetables)  She takes fiber powder 2 tsp BID along with a stool softener every other day as box notes not to take daily for over 7 days  She was tearful as nervous may get diverticulitis again due to constipation  Her [facility] records were obtained and scanned to [address]  5/31/21 ct scan noted diverticulosis of the colon with mild wall thickening in the proximal sigmoid colon with adjacent fat stranding most consistent with acute diverticulitis  Suggest correlation with follow up ct after resolution of the cute symptoms to exclude neoplastic process  Left ovarian cyst   Can fax records if needed  Pt would appreciate advice/discussion to address her constipation    Thank you

## 2021-07-13 NOTE — TELEPHONE ENCOUNTER
Pt left  mssg stating she has diverticulitis issues again; only has BM ev 3 or 4 days w/ fiber and stool softener; called for appt but can't get one until Sept or Oct  CB# [phone]

## 2021-07-13 NOTE — TELEPHONE ENCOUNTER
Spoke with patient  Advised her to take MiraLax in the morning and she can take senna/ Colace 2-3 tabs at night  Stay on high-fiber diet as well as fiber supplement once a day  Advised patient that she should come in for office visit so is scheduling can please call her  CT scan from 05/31 recommended repeat CT after resolution of acute symptoms but she did have colonoscopy within the past year,  patient currently has no significant abdominal pain and would avoid repeating CT scan but they can decide at office visit if this is indicated  Thank you  Patient verbalized understanding and will call if any problems or questions occur in the interim

## 2021-07-14 NOTE — TELEPHONE ENCOUNTER
Message left for pt to call back--wanting to check if willing to see any provider while placing on the wait list or only Dr Paras Calle

## 2021-07-14 NOTE — TELEPHONE ENCOUNTER
Pt returning call  She is willing to see any provider; originally only wanted to see IK/thought it would be easier, but is now willing to see anyone  If ques CB# [phone]

## 2021-07-15 ENCOUNTER — OFFICE VISIT (OUTPATIENT)
Dept: GASTROENTEROLOGY | Facility: CLINIC | Age: 36
End: 2021-07-15
Payer: COMMERCIAL

## 2021-07-15 VITALS
HEIGHT: 65 IN | WEIGHT: 192 LBS | SYSTOLIC BLOOD PRESSURE: 122 MMHG | HEART RATE: 86 BPM | DIASTOLIC BLOOD PRESSURE: 80 MMHG | BODY MASS INDEX: 31.99 KG/M2

## 2021-07-15 DIAGNOSIS — K60.2 ANAL FISSURE: Primary | ICD-10-CM

## 2021-07-15 DIAGNOSIS — K62.89 RECTAL PAIN: ICD-10-CM

## 2021-07-15 DIAGNOSIS — K64.9 HEMORRHOIDS, UNSPECIFIED HEMORRHOID TYPE: ICD-10-CM

## 2021-07-15 DIAGNOSIS — K57.92 DIVERTICULITIS: ICD-10-CM

## 2021-07-15 PROCEDURE — 99213 OFFICE O/P EST LOW 20 MIN: CPT | Performed by: INTERNAL MEDICINE

## 2021-07-15 RX ORDER — POLYETHYLENE GLYCOL 3350 17 G/17G
17 POWDER, FOR SOLUTION ORAL DAILY
COMMUNITY

## 2021-07-15 RX ORDER — HYDROCORTISONE ACETATE 25 MG/1
25 SUPPOSITORY RECTAL 2 TIMES DAILY
Qty: 14 SUPPOSITORY | Refills: 0 | Status: SHIPPED | OUTPATIENT
Start: 2021-07-15

## 2021-07-15 NOTE — LETTER
July 15, 2021     [facility] [address]  [id number] [facility] [place] [address]    Patient: Arlyn Whatley   YOB: 1985   Date of Visit: 7/15/2021       Dear Dr Miranda Frias: Thank you for referring Arlyn Whatley to me for evaluation  Below are my notes for this consultation  If you have questions, please do not hesitate to call me  I look forward to following your patient along with you  Sincerely,        Irina Buchanan MD        CC: Dontrell MD Irina Bowers MD  7/15/2021  3:33 PM  Incomplete  [address] [facility] - Outpatient Follow-up Note  Arlyn Whatley 30 y o  female MRN: [id number]  Encounter: [id number]    ASSESSMENT AND PLAN:      1  Anal fissure  2  Hemorrhoids, unspecified hemorrhoid type  - hydrocortisone (ANUSOL-HC) 25 mg suppository; Insert 1 suppository (25 mg total) into the rectum 2 (two) times a day  Dispense: 14 suppository; Refill: 0  3  Rectal pain  - nitroglycerin (NITRO-BID) 2 % ointment; 0 125 % compounded in petroleum jelly  Pea-sized amount apply topically 4 times daily to fissure  Dispense: 60 g; Refill: 0  Recurrent sharp anorectal pain most consistent with recurrent fissure  No longer taking nitroglycerin as she did not think it helped much  Seen by Colorectal surgery several months ago but fissure seem to be healing and no further interventions planned at that time  Given recurrence of symptoms stressed the importance of at least trying the nitroglycerin appointment to temporize and following up with colorectal surgery  Also important to have easy to pass soft bowel movements so encourage fiber and MiraLax    · Fiber supplement/psyllium husk 2 spoons daily with plenty of fluid  · Restart MiraLax daily, can increase to twice a day and adjust dose as needed  · If not tolerating MiraLax can switch back to using Colace  · Trial of Anusol suppository for local relief of hemorrhoid symptoms  · Restart nitroglycerin ointment for anal fissure  · Follow-up with colorectal surgery as planned    4  Diverticulitis  Recurrent diverticular disease  Discussed the importance of regular bowel movements and using fiber supplementation  Encourage plenty of fluids  Okay to use MiraLax to help regulate frequency and consistency of bowel movements  Followup Appointment:  As needed after surgical referral if symptoms return  ______________________________________________________________________    Chief Complaint   Patient presents with    Diverticulitis side effects still, rectal pain     HPI:  Had another bout of diverticulitis in May  Confirmed by CT scan the ER at [facility]   Got antibiotics  Still with some lingering left lower quadrant pain but overall better  Afraid to eat afraid to move her bowels because of anorectal pain  Having problems with bowels again and recurrence of anorectal pain and intermittent bleeding  Concerned about recurrent fissure  Pain on defecation  Very sharp searing pain the persists after she passes or bowel movements  Recently discussed with nursing and advised to take fiber supplement daily along with MiraLax daily and Colace  Had loose stools so backed off  Discussed titrating MiraLax and may be holding on Colace for now        Historical Information   Past Medical History:   Diagnosis Date    Anxiety     Colon polyp     Depression     Diverticulitis     Kidney stone     Seasonal allergies     Sleep apnea      Past Surgical History:   Procedure Laterality Date    COLONOSCOPY  10/19/2020    polyp in the sigmoid colon, mild diverticulosis in right and left colon, normal TI, biopsies showed tubular adenomatous polyps, random biopsies were negative for colitis, dysplasia     EYE SURGERY      TONSILLECTOMY      and adenoids    WISDOM TOOTH EXTRACTION       Social History     Substance and Sexual Activity   Alcohol Use Not Currently     Social History     Substance and Sexual Activity   Drug Use Yes    Types: Marijuana     Social History     Tobacco Use   Smoking Status Never Smoker   Smokeless Tobacco Never Used     Family History   Problem Relation Age of Onset    Diabetes Mother     Heart disease Father     Diabetes Father     Colon polyps Neg Hx     Colon cancer Neg Hx     Inflammatory bowel disease Neg Hx          Current Outpatient Medications:     calcium polycarbophil (Fiber-Caps) 625 mg tablet    fluticasone (FLONASE) 50 mcg/act nasal spray    naproxen (NAPROSYN) 250 mg tablet    nitroglycerin (NITRO-BID) 2 % ointment    polyethylene glycol (MIRALAX) 17 g packet    Sennosides-Docusate Sodium (COLACE 2-IN-1 PO)    etonogestrel (Nexplanon) subdermal implant    hydrocortisone (ANUSOL-HC) 25 mg suppository    saccharomyces boulardii (FLORASTOR) 250 mg capsule    sertraline (ZOLOFT) 50 mg tablet  Allergies   Allergen Reactions    Sulfa Antibiotics      Unknown from childhood     Reviewed medications and allergies and updated as indicated    PHYSICAL EXAM:    Blood pressure 122/80, pulse 86, height 5' 5" (1 651 m), weight 87 1 kg (192 lb)  Body mass index is 31 95 kg/m²  General Appearance: NAD, cooperative, alert  Eyes: Anicteric, PERRLA, EOMI  ENT:  Normocephalic, atraumatic, normal mucosa  Neck:  Supple, symmetrical, trachea midline  Resp:  Clear to auscultation bilaterally; no rales, rhonchi or wheezing; respirations unlabored   CV:  S1 S2, Regular rate and rhythm; no murmur, rub, or gallop  GI:  Soft, non-tender, non-distended; normal bowel sounds; no masses, no organomegaly   Rectal: Deferred  Musculoskeletal: No cyanosis, clubbing or edema  Normal ROM    Skin:  No jaundice, rashes, or lesions   Heme/Lymph: No palpable cervical lymphadenopathy  Psych: Normal affect, good eye contact  Neuro: No gross deficits, AAOx3    Lab Results:   No results found for: WBC, HGB, HCT, MCV, PLT  No results found for: NA, K, CL, CO2, ANIONGAP, BUN, CREATININE, GLUCOSE, GLUF, CALCIUM, CORRECTEDCA, AST, ALT, ALKPHOS, PROT, BILITOT, EGFR  No results found for: IRON, TIBC, FERRITIN  No results found for: LIPASE    Radiology Results:   No results found

## 2021-07-15 NOTE — PATIENT INSTRUCTIONS
Fiber supplement /psyllium husk 2 spoons daily  Restart MiraLax daily can increase to twice a day and adjust dose as needed  If not tolerating MiraLax can switch to Colace daily  Trial of Anusol suppository for local relief  Restart nitroglycerin  Follow-up with colorectal surgery as planned

## 2021-07-15 NOTE — PROGRESS NOTES
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  No longer taking nitroglycerin as she did not think it helped much  Seen by Colorectal surgery several months ago but fissure seem to be healing and no further interventions planned at that time  Given recurrence of symptoms stressed the importance of at least trying the nitroglycerin appointment to temporize and following up with colorectal surgery  Also important to have easy to pass soft bowel movements so encourage fiber and MiraLax  · Fiber supplement/psyllium husk 2 spoons daily with plenty of fluid  · Restart MiraLax daily, can increase to twice a day and adjust dose as needed  · If not tolerating MiraLax can switch back to using Colace  · Trial of Anusol suppository for local relief of hemorrhoid symptoms  · Restart nitroglycerin ointment for anal fissure  · Follow-up with colorectal surgery as planned    4  Diverticulitis  Recurrent diverticular disease  Discussed the importance of regular bowel movements and using fiber supplementation  Encourage plenty of fluids  Okay to use MiraLax to help regulate frequency and consistency of bowel movements        Followup Appointment:  As needed after surgical referral if symptoms return  ______________________________________________________________________    Chief Complaint   Patient presents with    Diverticulitis side effects still, rectal pain     HPI:  Had another bout of diverticulitis in May  Confirmed by CT scan the ER at [facility]   Got antibiotics  Still with some lingering left lower quadrant pain but overall better  Afraid to eat afraid to move her bowels because of anorectal pain  Having problems with bowels again and recurrence of anorectal pain and intermittent bleeding  Concerned about recurrent fissure  Pain on defecation  Very sharp searing pain the persists after she passes or bowel movements  Recently discussed with nursing and advised to take fiber supplement daily along with MiraLax daily and Colace  Had loose stools so backed off  Discussed titrating MiraLax and may be holding on Colace for now        Historical Information   Past Medical History:   Diagnosis Date    Anxiety     Colon polyp     Depression     Diverticulitis     Kidney stone     Seasonal allergies     Sleep apnea      Past Surgical History:   Procedure Laterality Date    COLONOSCOPY  10/19/2020    polyp in the sigmoid colon, mild diverticulosis in right and left colon, normal TI, biopsies showed tubular adenomatous polyps, random biopsies were negative for colitis, dysplasia     EYE SURGERY      TONSILLECTOMY      and adenoids    WISDOM TOOTH EXTRACTION       Social History     Substance and Sexual Activity   Alcohol Use Not Currently     Social History     Substance and Sexual Activity   Drug Use Yes    Types: Marijuana     Social History     Tobacco Use   Smoking Status Never Smoker   Smokeless Tobacco Never Used     Family History   Problem Relation Age of Onset    Diabetes Mother     Heart disease Father     Diabetes Father     Colon polyps Neg Hx     Colon cancer Neg Hx     Inflammatory bowel disease Neg Hx          Current Outpatient Medications:     calcium polycarbophil (Fiber-Caps) 625 mg tablet    fluticasone (FLONASE) 50 mcg/act nasal spray    naproxen (NAPROSYN) 250 mg tablet    nitroglycerin (NITRO-BID) 2 % ointment    polyethylene glycol (MIRALAX) 17 g packet    Sennosides-Docusate Sodium (COLACE 2-IN-1 PO)    etonogestrel (Nexplanon) subdermal implant    hydrocortisone (ANUSOL-HC) 25 mg suppository    saccharomyces boulardii (FLORASTOR) 250 mg capsule    sertraline (ZOLOFT) 50 mg tablet  Allergies   Allergen Reactions    Sulfa Antibiotics      Unknown from childhood     Reviewed medications and allergies and updated as indicated    PHYSICAL EXAM:    Blood pressure 122/80, pulse 86, height 5' 5" (1 651 m), weight 87 1 kg (192 lb)  Body mass index is 31 95 kg/m²  General Appearance: NAD, cooperative, alert  Eyes: Anicteric, PERRLA, EOMI  ENT:  Normocephalic, atraumatic, normal mucosa  Neck:  Supple, symmetrical, trachea midline  Resp:  Clear to auscultation bilaterally; no rales, rhonchi or wheezing; respirations unlabored   CV:  S1 S2, Regular rate and rhythm; no murmur, rub, or gallop  GI:  Soft, non-tender, non-distended; normal bowel sounds; no masses, no organomegaly   Rectal: Deferred  Musculoskeletal: No cyanosis, clubbing or edema  Normal ROM  Skin:  No jaundice, rashes, or lesions   Heme/Lymph: No palpable cervical lymphadenopathy  Psych: Normal affect, good eye contact  Neuro: No gross deficits, AAOx3    Lab Results:   No results found for: WBC, HGB, HCT, MCV, PLT  No results found for: NA, K, CL, CO2, ANIONGAP, BUN, CREATININE, GLUCOSE, GLUF, CALCIUM, CORRECTEDCA, AST, ALT, ALKPHOS, PROT, BILITOT, EGFR  No results found for: IRON, TIBC, FERRITIN  No results found for: LIPASE    Radiology Results:   No results found

## 2021-11-16 ENCOUNTER — TELEPHONE (OUTPATIENT)
Dept: GASTROENTEROLOGY | Facility: CLINIC | Age: 36
End: 2021-11-16